# Patient Record
Sex: FEMALE | Race: WHITE
[De-identification: names, ages, dates, MRNs, and addresses within clinical notes are randomized per-mention and may not be internally consistent; named-entity substitution may affect disease eponyms.]

---

## 2019-03-06 ENCOUNTER — HOSPITAL ENCOUNTER (INPATIENT)
Dept: HOSPITAL 8 - ED | Age: 79
LOS: 3 days | Discharge: HOME HEALTH | DRG: 246 | End: 2019-03-09
Attending: INTERNAL MEDICINE | Admitting: HOSPITALIST
Payer: MEDICARE

## 2019-03-06 VITALS — WEIGHT: 168.87 LBS | HEIGHT: 69 IN | BODY MASS INDEX: 25.01 KG/M2

## 2019-03-06 VITALS — DIASTOLIC BLOOD PRESSURE: 51 MMHG | SYSTOLIC BLOOD PRESSURE: 98 MMHG

## 2019-03-06 DIAGNOSIS — N17.0: ICD-10-CM

## 2019-03-06 DIAGNOSIS — Z91.013: ICD-10-CM

## 2019-03-06 DIAGNOSIS — Z90.722: ICD-10-CM

## 2019-03-06 DIAGNOSIS — Z88.0: ICD-10-CM

## 2019-03-06 DIAGNOSIS — E03.9: ICD-10-CM

## 2019-03-06 DIAGNOSIS — E53.8: ICD-10-CM

## 2019-03-06 DIAGNOSIS — Z86.73: ICD-10-CM

## 2019-03-06 DIAGNOSIS — E55.9: ICD-10-CM

## 2019-03-06 DIAGNOSIS — M51.36: ICD-10-CM

## 2019-03-06 DIAGNOSIS — Z95.5: ICD-10-CM

## 2019-03-06 DIAGNOSIS — Z90.710: ICD-10-CM

## 2019-03-06 DIAGNOSIS — I25.10: ICD-10-CM

## 2019-03-06 DIAGNOSIS — I10: ICD-10-CM

## 2019-03-06 DIAGNOSIS — K21.9: ICD-10-CM

## 2019-03-06 DIAGNOSIS — Z87.11: ICD-10-CM

## 2019-03-06 DIAGNOSIS — Z90.49: ICD-10-CM

## 2019-03-06 DIAGNOSIS — Z87.891: ICD-10-CM

## 2019-03-06 DIAGNOSIS — E78.5: ICD-10-CM

## 2019-03-06 DIAGNOSIS — Z88.2: ICD-10-CM

## 2019-03-06 DIAGNOSIS — K57.30: ICD-10-CM

## 2019-03-06 DIAGNOSIS — I21.4: Primary | ICD-10-CM

## 2019-03-06 LAB
ALBUMIN SERPL-MCNC: 3.7 G/DL (ref 3.4–5)
ALP SERPL-CCNC: 32 U/L (ref 45–117)
ALT SERPL-CCNC: 60 U/L (ref 12–78)
ANION GAP SERPL CALC-SCNC: 9 MMOL/L (ref 5–15)
BASOPHILS # BLD AUTO: 0.01 X10^3/UL (ref 0–0.1)
BASOPHILS NFR BLD AUTO: 0 % (ref 0–1)
BILIRUB SERPL-MCNC: 0.9 MG/DL (ref 0.2–1)
CALCIUM SERPL-MCNC: 8.9 MG/DL (ref 8.5–10.1)
CHLORIDE SERPL-SCNC: 101 MMOL/L (ref 98–107)
CREAT SERPL-MCNC: 1.37 MG/DL (ref 0.55–1.02)
CULTURE INDICATED?: NO
EOSINOPHIL # BLD AUTO: 0.02 X10^3/UL (ref 0–0.4)
EOSINOPHIL NFR BLD AUTO: 0 % (ref 1–7)
ERYTHROCYTE [DISTWIDTH] IN BLOOD BY AUTOMATED COUNT: 12.6 % (ref 9.6–15.2)
LYMPHOCYTES # BLD AUTO: 1.16 X10^3/UL (ref 1–3.4)
LYMPHOCYTES NFR BLD AUTO: 9 % (ref 22–44)
MCH RBC QN AUTO: 32.2 PG (ref 27–34.8)
MCHC RBC AUTO-ENTMCNC: 33 G/DL (ref 32.4–35.8)
MCV RBC AUTO: 97.6 FL (ref 80–100)
MD: NO
MICROSCOPIC: (no result)
MONOCYTES # BLD AUTO: 1.1 X10^3/UL (ref 0.2–0.8)
MONOCYTES NFR BLD AUTO: 9 % (ref 2–9)
NEUTROPHILS # BLD AUTO: 10.28 X10^3/UL (ref 1.8–6.8)
NEUTROPHILS NFR BLD AUTO: 82 % (ref 42–75)
PLATELET # BLD AUTO: 300 X10^3/UL (ref 130–400)
PMV BLD AUTO: 9 FL (ref 7.4–10.4)
PROT SERPL-MCNC: 7.9 G/DL (ref 6.4–8.2)
RBC # BLD AUTO: 4.15 X10^6/UL (ref 3.82–5.3)
TROPONIN I SERPL-MCNC: 19 NG/ML (ref 0–0.04)
TROPONIN I SERPL-MCNC: 20.7 NG/ML (ref 0–0.04)
TROPONIN I SERPL-MCNC: 29 NG/ML (ref 0–0.04)

## 2019-03-06 PROCEDURE — C1874 STENT, COATED/COV W/DEL SYS: HCPCS

## 2019-03-06 PROCEDURE — 96376 TX/PRO/DX INJ SAME DRUG ADON: CPT

## 2019-03-06 PROCEDURE — 96375 TX/PRO/DX INJ NEW DRUG ADDON: CPT

## 2019-03-06 PROCEDURE — 93306 TTE W/DOPPLER COMPLETE: CPT

## 2019-03-06 PROCEDURE — 80053 COMPREHEN METABOLIC PANEL: CPT

## 2019-03-06 PROCEDURE — C1725 CATH, TRANSLUMIN NON-LASER: HCPCS

## 2019-03-06 PROCEDURE — 71046 X-RAY EXAM CHEST 2 VIEWS: CPT

## 2019-03-06 PROCEDURE — 84100 ASSAY OF PHOSPHORUS: CPT

## 2019-03-06 PROCEDURE — 84484 ASSAY OF TROPONIN QUANT: CPT

## 2019-03-06 PROCEDURE — 81001 URINALYSIS AUTO W/SCOPE: CPT

## 2019-03-06 PROCEDURE — 85025 COMPLETE CBC W/AUTO DIFF WBC: CPT

## 2019-03-06 PROCEDURE — 027034Z DILATION OF CORONARY ARTERY, ONE ARTERY WITH DRUG-ELUTING INTRALUMINAL DEVICE, PERCUTANEOUS APPROACH: ICD-10-PCS | Performed by: INTERNAL MEDICINE

## 2019-03-06 PROCEDURE — C1769 GUIDE WIRE: HCPCS

## 2019-03-06 PROCEDURE — B2151ZZ FLUOROSCOPY OF LEFT HEART USING LOW OSMOLAR CONTRAST: ICD-10-PCS | Performed by: INTERNAL MEDICINE

## 2019-03-06 PROCEDURE — 4A023N7 MEASUREMENT OF CARDIAC SAMPLING AND PRESSURE, LEFT HEART, PERCUTANEOUS APPROACH: ICD-10-PCS | Performed by: INTERNAL MEDICINE

## 2019-03-06 PROCEDURE — 93005 ELECTROCARDIOGRAM TRACING: CPT

## 2019-03-06 PROCEDURE — 96374 THER/PROPH/DIAG INJ IV PUSH: CPT

## 2019-03-06 PROCEDURE — 74175 CTA ABDOMEN W/CONTRAST: CPT

## 2019-03-06 PROCEDURE — 36415 COLL VENOUS BLD VENIPUNCTURE: CPT

## 2019-03-06 PROCEDURE — 87081 CULTURE SCREEN ONLY: CPT

## 2019-03-06 PROCEDURE — C1887 CATHETER, GUIDING: HCPCS

## 2019-03-06 PROCEDURE — C1894 INTRO/SHEATH, NON-LASER: HCPCS

## 2019-03-06 PROCEDURE — 85520 HEPARIN ASSAY: CPT

## 2019-03-06 PROCEDURE — 93458 L HRT ARTERY/VENTRICLE ANGIO: CPT

## 2019-03-06 PROCEDURE — 99156 MOD SED OTH PHYS/QHP 5/>YRS: CPT

## 2019-03-06 PROCEDURE — 71275 CT ANGIOGRAPHY CHEST: CPT

## 2019-03-06 PROCEDURE — 83735 ASSAY OF MAGNESIUM: CPT

## 2019-03-06 PROCEDURE — C9600 PERC DRUG-EL COR STENT SING: HCPCS

## 2019-03-06 PROCEDURE — B2111ZZ FLUOROSCOPY OF MULTIPLE CORONARY ARTERIES USING LOW OSMOLAR CONTRAST: ICD-10-PCS | Performed by: INTERNAL MEDICINE

## 2019-03-06 PROCEDURE — 83880 ASSAY OF NATRIURETIC PEPTIDE: CPT

## 2019-03-06 RX ADMIN — ATORVASTATIN CALCIUM SCH MG: 80 TABLET, FILM COATED ORAL at 20:41

## 2019-03-06 RX ADMIN — MAGNESIUM OXIDE SCH MG: 400 TABLET ORAL at 20:41

## 2019-03-06 RX ADMIN — ONDANSETRON PRN MG: 2 INJECTION, SOLUTION INTRAMUSCULAR; INTRAVENOUS at 15:18

## 2019-03-06 RX ADMIN — ASPIRIN SCH MG: 81 TABLET, COATED ORAL at 14:27

## 2019-03-06 RX ADMIN — SODIUM CHLORIDE SCH MLS/HR: 0.9 INJECTION, SOLUTION INTRAVENOUS at 13:32

## 2019-03-06 RX ADMIN — ASPIRIN SCH MG: 81 TABLET, COATED ORAL at 12:30

## 2019-03-06 RX ADMIN — Medication SCH MG: at 20:42

## 2019-03-06 RX ADMIN — CHOLECALCIFEROL TAB 125 MCG (5000 UNIT) SCH UNIT: 125 TAB at 20:42

## 2019-03-06 RX ADMIN — MORPHINE SULFATE PRN MG: 10 INJECTION INTRAVENOUS at 15:13

## 2019-03-06 RX ADMIN — LEVOTHYROXINE SODIUM SCH MCG: 50 TABLET ORAL at 14:29

## 2019-03-06 RX ADMIN — OMEPRAZOLE SCH MG: 20 CAPSULE, DELAYED RELEASE ORAL at 14:27

## 2019-03-06 RX ADMIN — ESTRADIOL SCH MG: 0.5 TABLET ORAL at 20:41

## 2019-03-06 RX ADMIN — MORPHINE SULFATE PRN MG: 10 INJECTION INTRAVENOUS at 20:54

## 2019-03-06 RX ADMIN — SPIRONOLACTONE AND HYDROCHLOROTHIAZIDE SCH TAB: 25; 25 TABLET ORAL at 13:30

## 2019-03-06 RX ADMIN — TICAGRELOR SCH MG: 90 TABLET ORAL at 20:42

## 2019-03-06 RX ADMIN — GEMFIBROZIL SCH MG: 600 TABLET, FILM COATED ORAL at 20:42

## 2019-03-06 RX ADMIN — MONTELUKAST SODIUM SCH MG: 10 TABLET ORAL at 20:42

## 2019-03-06 NOTE — NUR
BIB [facility] FOR C/O CP STARTED 3 DAYS AGO W/ N/V FOR THE PAST 2 DAYS. PT STATES 
SHE HAD N/V RESOLVED TODAY. ALSO HAD NONPRODUCTIVE COUGH. PT RESTING ON GURNEY. 
EDPA LONG AT BEDSIDE. MONITORS APPLIED. WARM BLANKET PROVIDED. PIV INITIATED. 
EKG COMPLETED.

## 2019-03-07 VITALS — SYSTOLIC BLOOD PRESSURE: 94 MMHG | DIASTOLIC BLOOD PRESSURE: 61 MMHG

## 2019-03-07 LAB
ALBUMIN SERPL-MCNC: 2.9 G/DL (ref 3.4–5)
ALP SERPL-CCNC: 38 U/L (ref 45–117)
ALT SERPL-CCNC: 63 U/L (ref 12–78)
ANION GAP SERPL CALC-SCNC: 7 MMOL/L (ref 5–15)
BASOPHILS # BLD AUTO: 0.03 X10^3/UL (ref 0–0.1)
BASOPHILS NFR BLD AUTO: 0 % (ref 0–1)
BILIRUB SERPL-MCNC: 1.5 MG/DL (ref 0.2–1)
CALCIUM SERPL-MCNC: 8.4 MG/DL (ref 8.5–10.1)
CHLORIDE SERPL-SCNC: 105 MMOL/L (ref 98–107)
CREAT SERPL-MCNC: 1.34 MG/DL (ref 0.55–1.02)
EOSINOPHIL # BLD AUTO: 0.2 X10^3/UL (ref 0–0.4)
EOSINOPHIL NFR BLD AUTO: 2 % (ref 1–7)
ERYTHROCYTE [DISTWIDTH] IN BLOOD BY AUTOMATED COUNT: 12.4 % (ref 9.6–15.2)
LYMPHOCYTES # BLD AUTO: 1.16 X10^3/UL (ref 1–3.4)
LYMPHOCYTES NFR BLD AUTO: 13 % (ref 22–44)
MCH RBC QN AUTO: 33 PG (ref 27–34.8)
MCHC RBC AUTO-ENTMCNC: 33.8 G/DL (ref 32.4–35.8)
MCV RBC AUTO: 97.5 FL (ref 80–100)
MD: NO
MONOCYTES # BLD AUTO: 1.21 X10^3/UL (ref 0.2–0.8)
MONOCYTES NFR BLD AUTO: 14 % (ref 2–9)
NEUTROPHILS # BLD AUTO: 6.17 X10^3/UL (ref 1.8–6.8)
NEUTROPHILS NFR BLD AUTO: 70 % (ref 42–75)
PLATELET # BLD AUTO: 257 X10^3/UL (ref 130–400)
PMV BLD AUTO: 8.8 FL (ref 7.4–10.4)
PROT SERPL-MCNC: 6.4 G/DL (ref 6.4–8.2)
RBC # BLD AUTO: 3.47 X10^6/UL (ref 3.82–5.3)
TROPONIN I SERPL-MCNC: 21.8 NG/ML (ref 0–0.04)

## 2019-03-07 RX ADMIN — MAGNESIUM OXIDE SCH MG: 400 TABLET ORAL at 21:33

## 2019-03-07 RX ADMIN — Medication SCH MG: at 09:25

## 2019-03-07 RX ADMIN — TICAGRELOR SCH MG: 90 TABLET ORAL at 21:33

## 2019-03-07 RX ADMIN — ESTRADIOL SCH MG: 0.5 TABLET ORAL at 21:33

## 2019-03-07 RX ADMIN — SPIRONOLACTONE AND HYDROCHLOROTHIAZIDE SCH TAB: 25; 25 TABLET ORAL at 09:00

## 2019-03-07 RX ADMIN — ATORVASTATIN CALCIUM SCH MG: 80 TABLET, FILM COATED ORAL at 21:33

## 2019-03-07 RX ADMIN — ONDANSETRON PRN MG: 2 INJECTION, SOLUTION INTRAMUSCULAR; INTRAVENOUS at 06:47

## 2019-03-07 RX ADMIN — Medication SCH MCG: at 09:24

## 2019-03-07 RX ADMIN — SODIUM CHLORIDE SCH MLS/HR: 0.9 INJECTION, SOLUTION INTRAVENOUS at 09:11

## 2019-03-07 RX ADMIN — TICAGRELOR SCH MG: 90 TABLET ORAL at 09:24

## 2019-03-07 RX ADMIN — MONTELUKAST SODIUM SCH MG: 10 TABLET ORAL at 21:33

## 2019-03-07 RX ADMIN — CHOLECALCIFEROL TAB 125 MCG (5000 UNIT) SCH UNIT: 125 TAB at 09:25

## 2019-03-07 RX ADMIN — GEMFIBROZIL SCH MG: 600 TABLET, FILM COATED ORAL at 09:24

## 2019-03-07 RX ADMIN — SODIUM CHLORIDE SCH MLS/HR: 0.9 INJECTION, SOLUTION INTRAVENOUS at 04:27

## 2019-03-07 RX ADMIN — LEVOTHYROXINE SODIUM SCH MCG: 50 TABLET ORAL at 05:20

## 2019-03-07 RX ADMIN — SODIUM CHLORIDE SCH MLS/HR: 0.9 INJECTION, SOLUTION INTRAVENOUS at 00:00

## 2019-03-07 RX ADMIN — OMEPRAZOLE SCH MG: 20 CAPSULE, DELAYED RELEASE ORAL at 09:25

## 2019-03-07 RX ADMIN — MORPHINE SULFATE PRN MG: 10 INJECTION INTRAVENOUS at 01:00

## 2019-03-08 VITALS — DIASTOLIC BLOOD PRESSURE: 49 MMHG | SYSTOLIC BLOOD PRESSURE: 104 MMHG

## 2019-03-08 VITALS — SYSTOLIC BLOOD PRESSURE: 106 MMHG | DIASTOLIC BLOOD PRESSURE: 62 MMHG

## 2019-03-08 VITALS — SYSTOLIC BLOOD PRESSURE: 87 MMHG | DIASTOLIC BLOOD PRESSURE: 52 MMHG

## 2019-03-08 VITALS — SYSTOLIC BLOOD PRESSURE: 103 MMHG | DIASTOLIC BLOOD PRESSURE: 54 MMHG

## 2019-03-08 VITALS — SYSTOLIC BLOOD PRESSURE: 100 MMHG | DIASTOLIC BLOOD PRESSURE: 65 MMHG

## 2019-03-08 RX ADMIN — MONTELUKAST SODIUM SCH MG: 10 TABLET ORAL at 21:00

## 2019-03-08 RX ADMIN — TICAGRELOR SCH MG: 90 TABLET ORAL at 09:42

## 2019-03-08 RX ADMIN — ATORVASTATIN CALCIUM SCH MG: 80 TABLET, FILM COATED ORAL at 21:13

## 2019-03-08 RX ADMIN — TICAGRELOR SCH MG: 90 TABLET ORAL at 21:13

## 2019-03-08 RX ADMIN — MAGNESIUM OXIDE SCH MG: 400 TABLET ORAL at 21:12

## 2019-03-08 RX ADMIN — CHOLECALCIFEROL TAB 125 MCG (5000 UNIT) SCH UNIT: 125 TAB at 09:42

## 2019-03-08 RX ADMIN — ESTRADIOL SCH MG: 0.5 TABLET ORAL at 21:12

## 2019-03-08 RX ADMIN — OMEPRAZOLE SCH MG: 20 CAPSULE, DELAYED RELEASE ORAL at 09:42

## 2019-03-08 RX ADMIN — LEVOTHYROXINE SODIUM SCH MCG: 50 TABLET ORAL at 06:33

## 2019-03-08 RX ADMIN — Medication SCH MCG: at 09:43

## 2019-03-08 RX ADMIN — ASPIRIN SCH MG: 81 TABLET, COATED ORAL at 09:43

## 2019-03-08 RX ADMIN — LISINOPRIL SCH MG: 5 TABLET ORAL at 10:00

## 2019-03-09 VITALS — DIASTOLIC BLOOD PRESSURE: 72 MMHG | SYSTOLIC BLOOD PRESSURE: 122 MMHG

## 2019-03-09 VITALS — DIASTOLIC BLOOD PRESSURE: 63 MMHG | SYSTOLIC BLOOD PRESSURE: 117 MMHG

## 2019-03-09 VITALS — SYSTOLIC BLOOD PRESSURE: 118 MMHG | DIASTOLIC BLOOD PRESSURE: 75 MMHG

## 2019-03-09 RX ADMIN — LEVOTHYROXINE SODIUM SCH MCG: 50 TABLET ORAL at 05:33

## 2019-03-09 RX ADMIN — LISINOPRIL SCH MG: 5 TABLET ORAL at 08:05

## 2019-03-09 RX ADMIN — OMEPRAZOLE SCH MG: 20 CAPSULE, DELAYED RELEASE ORAL at 08:03

## 2019-03-09 RX ADMIN — CHOLECALCIFEROL TAB 125 MCG (5000 UNIT) SCH UNIT: 125 TAB at 08:03

## 2019-03-09 RX ADMIN — ASPIRIN SCH MG: 81 TABLET, COATED ORAL at 08:11

## 2019-03-09 RX ADMIN — TICAGRELOR SCH MG: 90 TABLET ORAL at 08:03

## 2019-03-09 RX ADMIN — Medication SCH MCG: at 08:03
